# Patient Record
Sex: FEMALE | Race: WHITE | NOT HISPANIC OR LATINO | URBAN - METROPOLITAN AREA
[De-identification: names, ages, dates, MRNs, and addresses within clinical notes are randomized per-mention and may not be internally consistent; named-entity substitution may affect disease eponyms.]

---

## 2024-06-26 ENCOUNTER — OFFICE VISIT (OUTPATIENT)
Dept: OBGYN CLINIC | Facility: CLINIC | Age: 39
End: 2024-06-26
Payer: COMMERCIAL

## 2024-06-26 VITALS — DIASTOLIC BLOOD PRESSURE: 75 MMHG | SYSTOLIC BLOOD PRESSURE: 116 MMHG

## 2024-06-26 DIAGNOSIS — G56.02 CARPAL TUNNEL SYNDROME ON LEFT: Primary | ICD-10-CM

## 2024-06-26 PROCEDURE — 99204 OFFICE O/P NEW MOD 45 MIN: CPT | Performed by: SURGERY

## 2024-06-26 RX ORDER — VENLAFAXINE HYDROCHLORIDE 150 MG/1
150 CAPSULE, EXTENDED RELEASE ORAL
COMMUNITY
Start: 2021-06-26

## 2024-06-26 RX ORDER — ESOMEPRAZOLE MAGNESIUM 20 MG/1
TABLET, DELAYED RELEASE ORAL DAILY
COMMUNITY

## 2024-06-26 RX ORDER — NALTREXONE HYDROCHLORIDE 50 MG/1
TABLET, FILM COATED ORAL
COMMUNITY

## 2024-06-26 RX ORDER — AMOXICILLIN 500 MG/1
CAPSULE ORAL
COMMUNITY
Start: 2024-06-17

## 2024-06-26 RX ORDER — CETIRIZINE HYDROCHLORIDE 1 MG/ML
SOLUTION ORAL
COMMUNITY

## 2024-06-26 RX ORDER — ESOMEPRAZOLE MAGNESIUM 20 MG/1
GRANULE, DELAYED RELEASE ORAL
COMMUNITY

## 2024-06-26 RX ORDER — VITAMIN B COMPLEX
TABLET ORAL
COMMUNITY

## 2024-06-26 NOTE — PROGRESS NOTES
Hand Surgery History and Physical    06/26/24  2:01 PM  [id number]  Carmelita Junior      HPI:  Pt is a 37 yo female who presents for evaluation of her left hand.  She notes numbness and tingling a lot of the time.  Denies any small finger numbness.  She has a history of right carpal tunnel release by me in 2020 at [facility].  She has tried splinting without improvement of late.      No past medical history on file.    No past surgical history on file.    No family history on file.    Review of Systems - General ROS: negative  Ophthalmic ROS: negative  ENT ROS: negative  Respiratory ROS: negative  Cardiovascular ROS: negative  Neurological ROS: negative  Dermatological ROS: negative    Vitals:    06/26/24 1350   BP: 116/75       Physical Examination:  General:  NAD  HEENT:  EOMI, perrla, normal ear morphology  Chest:  Unlabored breathing  Heart:  Regular pulse  Abd:  No distension  Extrem: LUE:  no atrophy, able to make a full fist complex, no edema, carpal tunnel compression test with tingling to radial digits, 2 pt discrimination:  5 mm in all fingers  Skin:  Dry, pink  Neuro:  AAOx3     Encounter Diagnosis   Name Primary?    Carpal tunnel syndrome on left Yes     Return for OR.  2 week follow up.      A/P:  Pt is a 37 yo female with left carpal tunnel syndrome.   -EMG reviewed from 2020.  Bilateral carpal tunnel syndrome.  -Discussed treatment options.  -Recommend left carpal tunnel release.  -Local.  -Consent obtained.  -Will schedule.

## 2024-06-26 NOTE — H&P (VIEW-ONLY)
Hand Surgery History and Physical    06/26/24  2:01 PM  [id number]  Carmelita Junior      HPI:  Pt is a 39 yo female who presents for evaluation of her left hand.  She notes numbness and tingling a lot of the time.  Denies any small finger numbness.  She has a history of right carpal tunnel release by me in 2020 at [facility].  She has tried splinting without improvement of late.      No past medical history on file.    No past surgical history on file.    No family history on file.    Review of Systems - General ROS: negative  Ophthalmic ROS: negative  ENT ROS: negative  Respiratory ROS: negative  Cardiovascular ROS: negative  Neurological ROS: negative  Dermatological ROS: negative    Vitals:    06/26/24 1350   BP: 116/75       Physical Examination:  General:  NAD  HEENT:  EOMI, perrla, normal ear morphology  Chest:  Unlabored breathing  Heart:  Regular pulse  Abd:  No distension  Extrem: LUE:  no atrophy, able to make a full fist complex, no edema, carpal tunnel compression test with tingling to radial digits, 2 pt discrimination:  5 mm in all fingers  Skin:  Dry, pink  Neuro:  AAOx3     Encounter Diagnosis   Name Primary?    Carpal tunnel syndrome on left Yes     Return for OR.  2 week follow up.      A/P:  Pt is a 39 yo female with left carpal tunnel syndrome.   -EMG reviewed from 2020.  Bilateral carpal tunnel syndrome.  -Discussed treatment options.  -Recommend left carpal tunnel release.  -Local.  -Consent obtained.  -Will schedule.

## 2024-07-19 ENCOUNTER — HOSPITAL ENCOUNTER (OUTPATIENT)
Age: 39
Setting detail: OUTPATIENT SURGERY
Discharge: HOME | End: 2024-07-19
Attending: SURGERY | Admitting: SURGERY
Payer: COMMERCIAL

## 2024-07-19 VITALS
OXYGEN SATURATION: 97 % | BODY MASS INDEX: 38.55 KG/M2 | HEART RATE: 102 BPM | HEIGHT: 64 IN | WEIGHT: 225.8 LBS | TEMPERATURE: 97.7 F | SYSTOLIC BLOOD PRESSURE: 121 MMHG | RESPIRATION RATE: 16 BRPM | DIASTOLIC BLOOD PRESSURE: 85 MMHG

## 2024-07-19 DIAGNOSIS — Z48.89 AFTERCARE FOLLOWING SURGERY: Primary | ICD-10-CM

## 2024-07-19 LAB
EXT PREGNANCY TEST URINE: NEGATIVE
EXT. CONTROL: NORMAL

## 2024-07-19 PROCEDURE — 64721 CARPAL TUNNEL SURGERY: CPT | Performed by: PHYSICIAN ASSISTANT

## 2024-07-19 PROCEDURE — 64721 CARPAL TUNNEL SURGERY: CPT | Performed by: SURGERY

## 2024-07-19 PROCEDURE — 81025 URINE PREGNANCY TEST: CPT | Performed by: SURGERY

## 2024-07-19 RX ORDER — ACETAMINOPHEN 325 MG/1
650 TABLET ORAL EVERY 6 HOURS PRN
Status: DISCONTINUED | OUTPATIENT
Start: 2024-07-19 | End: 2024-07-19 | Stop reason: HOSPADM

## 2024-07-19 RX ORDER — NAPROXEN 500 MG/1
500 TABLET ORAL 2 TIMES DAILY WITH MEALS
Qty: 10 TABLET | Refills: 0 | Status: SHIPPED | OUTPATIENT
Start: 2024-07-19 | End: 2024-07-24

## 2024-07-19 NOTE — INTERVAL H&P NOTE
H&P reviewed. After examining the patient I find no changes in the patients condition since the H&P had been written.    Vitals:    07/19/24 1215   BP: 135/90   Pulse: 97   Resp: 16   Temp: 97.8 °F (36.6 °C)   SpO2: 97%

## 2024-07-19 NOTE — DISCHARGE INSTR - AVS FIRST PAGE
Elevate hand above heart as much as possible to help pain and swelling.  May use hand for simple tasks, but no heavy lifting or tight squeezing x 2 weeks.  Keep operative bandage clean and dry. You may remove bandage in 4 days, just place a band-aid over incision.  You are permitted to shower after 4 days with band-aid off.  Perform simple finger motion exercises: opening & closing fingers 10 x every hr.  Follow-up in the office per your scheduled post-op appointment for suture removal.

## 2024-07-19 NOTE — OP NOTE
OPERATIVE REPORT  PATIENT NAME: Carmelita Junior    :  1985  MRN: [id number]  Pt Location: [facility] OR ROOM 06    SURGERY DATE: 2024    Surgeons and Role:     * Sam Martini MD - Primary     * Yvon Manrique PA-C - Assisting    Preop Diagnosis:  Carpal tunnel syndrome on left [G56.02]    Post-Op Diagnosis Codes:     * Carpal tunnel syndrome on left [G56.02]    Procedure(s):  Left - RELEASE CARPAL TUNNEL. LEFT    Specimen(s):  * No specimens in log *    Estimated Blood Loss:   Minimal    Drains:  * No LDAs found *    Anesthesia Type:   Local    Operative Indications:  Carpal tunnel syndrome on left [G56.02]      Operative Findings:  Healthy appearing nerve after release      Complications:   None    Procedure and Technique:  The patient's left hand was cleansed with alcohol.  A field block was performed using marcaine 0.25% with epinephrine and lidocaine 1% with epinephrine in a 50-50 mixture.  The left upper extremity was prepped and draped in a sterile fashion.  A longitudinal incision was made overlying the transverse carpal ligament in line with the ring finger in a flexed position.  Sharp dissection was performed down to the level of the transverse carpal ligament.  A Weitlaner was used for counterretraction.  The transverse carpal ligament was divided with a 15 blade scalpel distally, and tenotomy scissors proximally along with a portion of the distal antebrachial fascia.  The wound was irrigated copiously with normal saline.  The skin was approximated with 4-0 nylon in an interrupted horizontal mattress fashion.  Xeroform was applied followed by gauze and an ace bandage over wrap.  The patient was transferred to recovery room in stable condition.      I was present for the entire procedure.    Patient Disposition:  PACU     My Assistant was necessary throughout the procedure(s) for retraction and positioning.    I understand that section 1842 (b)(7)(D) of the Social Security Act generally prohibits  Medicare physician fee schedule payment for the services of assistants-at-surgery in teaching hospitals when qualified residents are available to furnish such services. I certify that the services for which payment is claimed were medically necessary, and that no qualified resident was available to perform the services. I further understand that these services are subject to post-payment review by the Medicare carrier.      SIGNATURE: Sam Martini MD  DATE: July 19, 2024  TIME: 2:22 PM

## 2024-07-29 ENCOUNTER — OFFICE VISIT (OUTPATIENT)
Dept: OBGYN CLINIC | Facility: CLINIC | Age: 39
End: 2024-07-29

## 2024-07-29 VITALS
SYSTOLIC BLOOD PRESSURE: 124 MMHG | BODY MASS INDEX: 38.41 KG/M2 | DIASTOLIC BLOOD PRESSURE: 86 MMHG | HEIGHT: 64 IN | WEIGHT: 225 LBS

## 2024-07-29 DIAGNOSIS — G56.02 CARPAL TUNNEL SYNDROME ON LEFT: Primary | ICD-10-CM

## 2024-07-29 PROCEDURE — 99024 POSTOP FOLLOW-UP VISIT: CPT | Performed by: PHYSICIAN ASSISTANT

## 2024-07-29 RX ORDER — BUPROPION HYDROCHLORIDE 300 MG/1
300 TABLET ORAL DAILY
COMMUNITY

## 2024-07-29 RX ORDER — ATENOLOL 25 MG/1
25 TABLET ORAL EVERY MORNING
COMMUNITY

## 2024-07-29 NOTE — LETTER
July 29, 2024     Patient: Carmelita Junior  YOB: 1985  Date of Visit: 7/29/2024      To Whom it May Concern:    Carmelita Junior is under my professional care. Carmelita was seen in my office on 7/29/2024. Carmelita is able to work without restrictions 7/30/2024.    If you have any questions or concerns, please don't hesitate to call.         Sincerely,          Yvon Manrique PA-C        CC: No Recipients

## 2024-07-29 NOTE — PROGRESS NOTES
[facility] Orthopedic Surgery  Patient Name:  Carmelita Junior  Surgery:  Left carpal tunnel release   Surgery Date:  7/19/2024      Subjective  Patient reports overall doing well with resolved preop symptoms.  She denies any significant pain or numbness and tingling in the left hand.  No issues with the incision, no fever or chills.  She is happy with the results of the procedure.        Objective    Vitals:    07/29/24 1258   BP: 124/86   Left hand  Incision is c/d/I, and healed.  No erythema, drainage, or wound dehiscence.    Full composite fist  Normal wrist ROM  SILT m/r/u nerve distribution  Palpable radial pulse       Assessment  S/p left carpal tunnel release 7/19/2024.  Doing well, sutures removed.        Plan  Activities as tolerated and increase as able.  No specific restrictions from our standpoint.  Massage and moisturize the incisional area.    Do not have cover at this point.  Work note provided.    Follow-up PRN - call with any questions or concerns.      Suture removal    Date/Time: 7/29/2024 1:00 PM    Performed by: Yvon Manrique PA-C  Authorized by: Yvon Manrique PA-C  Universal Protocol:  Consent: Verbal consent obtained.  Risks and benefits: risks, benefits and alternatives were discussed  Consent given by: patient  Patient identity confirmed: verbally with patient      Patient location:  Clinic  Location:     Laterality:  Left    Location:  Upper extremity    Upper extremity location:  Hand    Hand location:  L hand  Procedure details:     Tools used:  Suture removal kit    Wound appearance:  No sign(s) of infection, good wound healing and clean  Post-procedure details:     Post-removal:  No dressing applied    Patient tolerance of procedure:  Tolerated well, no immediate complications

## (undated) DEVICE — STERILE BETHLEHEM PLASTIC HAND: Brand: CARDINAL HEALTH

## (undated) DEVICE — GLOVE SRG BIOGEL 7.5

## (undated) DEVICE — INTENDED FOR TISSUE SEPARATION, AND OTHER PROCEDURES THAT REQUIRE A SHARP SURGICAL BLADE TO PUNCTURE OR CUT.: Brand: BARD-PARKER ® CARBON RIB-BACK BLADES

## (undated) DEVICE — PREP PAD BNS: Brand: CONVERTORS

## (undated) DEVICE — GLOVE INDICATOR PI UNDERGLOVE SZ 7.5 BLUE

## (undated) DEVICE — ACE WRAP 4 IN STERILE

## (undated) DEVICE — GAUZE SPONGES,16 PLY: Brand: CURITY

## (undated) DEVICE — SUT ETHILON 4-0 PS-2 18 IN 1667H

## (undated) DEVICE — GLOVE INDICATOR PI UNDERGLOVE SZ 8 BLUE

## (undated) DEVICE — OCCLUSIVE GAUZE STRIP,3% BISMUTH TRIBROMOPHENATE IN PETROLATUM BLEND: Brand: XEROFORM